# Patient Record
Sex: FEMALE
[De-identification: names, ages, dates, MRNs, and addresses within clinical notes are randomized per-mention and may not be internally consistent; named-entity substitution may affect disease eponyms.]

---

## 2019-02-13 ENCOUNTER — HOSPITAL ENCOUNTER (OUTPATIENT)
Dept: HOSPITAL 72 - SUR | Age: 34
Discharge: HOME | End: 2019-02-13
Payer: COMMERCIAL

## 2019-02-13 VITALS — DIASTOLIC BLOOD PRESSURE: 64 MMHG | SYSTOLIC BLOOD PRESSURE: 100 MMHG

## 2019-02-13 VITALS — SYSTOLIC BLOOD PRESSURE: 102 MMHG | DIASTOLIC BLOOD PRESSURE: 67 MMHG

## 2019-02-13 VITALS — SYSTOLIC BLOOD PRESSURE: 103 MMHG | DIASTOLIC BLOOD PRESSURE: 63 MMHG

## 2019-02-13 VITALS — BODY MASS INDEX: 18.4 KG/M2 | WEIGHT: 100 LBS | HEIGHT: 62 IN

## 2019-02-13 VITALS — SYSTOLIC BLOOD PRESSURE: 98 MMHG | DIASTOLIC BLOOD PRESSURE: 60 MMHG

## 2019-02-13 VITALS — DIASTOLIC BLOOD PRESSURE: 61 MMHG | SYSTOLIC BLOOD PRESSURE: 104 MMHG

## 2019-02-13 VITALS — SYSTOLIC BLOOD PRESSURE: 115 MMHG | DIASTOLIC BLOOD PRESSURE: 60 MMHG

## 2019-02-13 VITALS — SYSTOLIC BLOOD PRESSURE: 104 MMHG | DIASTOLIC BLOOD PRESSURE: 74 MMHG
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
## 2019-02-13 DIAGNOSIS — T84.84XA: Primary | ICD-10-CM

## 2019-02-13 DIAGNOSIS — J30.9: ICD-10-CM
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
## 2019-02-13 PROCEDURE — 94150 VITAL CAPACITY TEST: CPT

## 2019-02-13 PROCEDURE — 20680 REMOVAL OF IMPLANT DEEP: CPT

## 2019-02-13 PROCEDURE — 94003 VENT MGMT INPAT SUBQ DAY: CPT

## 2019-02-13 PROCEDURE — 81025 URINE PREGNANCY TEST: CPT

## 2019-02-13 NOTE — 48 HOUR POST ANESTHESIA EVAL
Post Anesthesia Evaluation


Procedure:  RIGHT foot hardware removal attempted


Date of Evaluation:  Feb 13, 2019


Time of Evaluation:  13:28


Blood Pressure Systolic:  98


0:  60


Pulse Rate:  47


Respiratory Rate:  20


Temperature (Fahrenheit):  97.8


O2 Sat by Pulse Oximetry:  98


Airway:  patent


Nausea:  No


Vomiting:  No


Pain Intensity:  0


Hydration Status:  adequate


Cardiopulmonary Status:


stable


Mental Status/LOC:  patient returned to baseline


Follow-up Care/Observations:


per podiatry


Post-Anesthesia Complications:


none


Follow-up care needed:  ready to discharge











Grace Starr CRNA Feb 13, 2019 13:30

## 2019-02-13 NOTE — HISTORY AND PHYSICAL REPORT
DATE OF ADMISSION:  02/13/2019

PODIATRIC HISTORY AND PHYSICAL



HISTORY OF PRESENT ILLNESS:  This is a 33-year-old female that is admitted

today for an outpatient procedure of her right foot.  The patient had been

complaining of retained screw from prior bunionectomy, which was performed

last year and she indicated that the screw was misplaced and is causing

pain and discomfort while wearing shoe-gear.  The patient underwent

minimal incision type of bunionectomy to her right foot in May 2018 and

the healing process was uneventful.  She is concerned about the protrusion

of the screw and would like to have the removal of the hardware.  The

patient was consulted on surgery and was admitted today for the removal of

the hardware.



PAST MEDICAL HISTORY:  Unremarkable.



MEDICATIONS:  None.



ALLERGIES:  No known drug allergies.



PODIATRIC PHYSICAL EXAMINATION:

VASCULAR STATUS:  The dorsalis pedis and posterior tibial arteries are

equally strong measuring 3/4 bilaterally.  The capillary filling time is

less than 3 seconds to all digits bilaterally.  Skin temperature is warm.

Homans sign is negative.

NEUROLOGICAL:  Intact reflexes, Achilles and patellar measuring 2/4

bilaterally.  Sensation, proprioception, and vibration are all intact in

bilateral lower extremities.  Babinski is negative.  Clonus is absent

bilaterally.

MUSCULOSKELETAL:  Proper alignment of the right hallux, status post

bunionectomy.  There is hallux abductovalgus with bunion deformity on the

left foot.  The lesser digits are normal without any deformities.  Range

of motions of all joints are full and without crepitation or pain

bilaterally.  Palpable protruding screw is noted over the medial aspect of

the right foot at the level of the first metatarsal shaft proximally.

DERMATOLOGICAL:  Small scar from minimal incision surgery on the right

foot.  No other lesions or ulcerations are noted bilaterally.  All nails

are present and healthy bilaterally.



RADIOGRAPHIC EXAMINATION:  Evidence of a chevron type osteotomy with

capital transposition of the head of the first metatarsal on the right

foot.  The bone had healed from the bunionectomy and an evidence of screw,

which is driven from proximal medial to distal lateral is noted from the

midshaft of the first metatarsal extending to the head of the first

metatarsal.  No other hardware is present.



ASSESSMENT:  Painful screw, status post bunionectomy, right foot.



PLAN:  The patient is admitted today for an outpatient surgery in order to

remove the painful screw from her right foot.  Risks, complications, and

alternatives discussed with the patient.  Postoperative medications were

dispensed to the patient.  The patient elected to proceed with surgery.









  ______________________________________________

  Hermann Power D.P.M.





DR:  GUY

D:  02/13/2019 15:27

T:  02/13/2019 19:22

JOB#:  [id number]

CC:

## 2019-02-13 NOTE — PRE-PROCEDURE NOTE/ATTESTATION
Pre-Procedure Note/Attestation


Complete Prior to Procedure


Planned Procedure:  right


Procedure Narrative:


Removal of screw right foot





Indications for Procedure


Pre-Operative Diagnosis:


Painful hardware right foot





Attestation


I attest that I discussed the nature of the procedure; its benefits; risks and 

complications; and alternatives (and the risks and benefits of such alternatives

), prior to the procedure, with the patient (or the patient's legal 

representative).





I attest that, if there was a reasonable possibility of needing a blood 

transfusion, the patient (or the patient's legal representative) was given the 

[facility] standardized written summary, pursuant 

to the [place] Blood Safety Act ([place] Health and Safety Code # 1645, as 

amended).





I attest that I re-evaluated the patient just prior to the surgery and that 

there has been no change in the patient's H&P, except as documented below:











Hermann Power DPM Feb 13, 2019 11:16

## 2019-02-13 NOTE — OPERATIVE NOTE - DICTATED
DATE OF OPERATION:  02/13/2019

SURGEON:  Hermann Power D.P.M.



ANESTHESIOLOGIST:  Grace Starr CRNA



ANESTHESIA:  Local standby.



PREOPERATIVE DIAGNOSIS:  Painful hardware, right foot.



POSTOPERATIVE DIAGNOSIS:  Painful hardware, right foot.



PROCEDURE PERFORMED:  Failed attempt to removal of screw, right

foot.



DESCRIPTION OF THE OPERATION:  The patient was brought to the operating

room and was placed on the operating room table in the supine position.

IV sedation was administered by the anesthesiologist.  Local anesthesia

consisting of 0.5% Marcaine plain, total of 10 mL was administered to the

right foot.  An ankle tourniquet was applied to the right lower extremity.

The foot was prepped and draped in the usual sterile manner.  An Esmarch

bandage was utilized to exsanguinate the blood and the right ankle

tourniquet was inflated to 250 mmHg.  Attention was directed to the right

foot where an approximately 1.5 cm linear incision was centered over a

protruding screw at the level of the medial aspect of the first metatarsal

midshaft.  The incision was deepened with care being taken to cauterize

and ligate all bleeders.  The soft tissue was reflected and the screw was

exposed.  At this point, multiple screwdrivers from a variety of hardware sets,

which included the small and mini AO sets were utilized in an attempt to

remove the screw and all attempts had failed.  Other instrumentations

including a straight Nicolasa and mosquitoes were also utilized in an attempt

to rotate the screw, which was buried deep into the first metatarsal all

the way to the head.  After the attempts were all failed, the wound was

flushed utilizing sterile saline and it was decided to close the wounds

when the surgery time was nearing almost 100 minutes.  At this point, the

skin was reapproximated utilizing 4-0 nylon in a simple interrupted type

stitch.  The wound was dressed utilizing an Adaptic 4 x 4 gauze and Coban.

The right ankle tourniquet was deflated and vascular supply was noted to

all digits of right foot.  The patient tolerated the procedure well and

left the operating room to recovery room with all vital signs stable.









  ______________________________________________

  Hermann Power D.P.M.





DR:  SINGH

D:  02/13/2019 16:40

T:  02/13/2019 21:50

JOB#:  [id number]

CC:



ANTOINE

## 2019-02-13 NOTE — OPERATIVE NOTE - DICTATED
DATE OF OPERATION:  02/13/2019

NOTE:  INCOMPLETE DICTATION



SURGEON:  Hermann Power DPM.



ANESTHESIOLOGIST:









  ______________________________________________

  Hermann Power D.P.M.





DR:  OBI

D:  02/13/2019 16:33

T:  02/13/2019 20:41

JOB#:  [id number]

CC:

## 2019-02-13 NOTE — ANETHESIA PREOPERATIVE EVAL
Anesthesia Pre-op PMH/ROS


General


Date of Evaluation:  Feb 13, 2019


Time of Evaluation:  10:28


Anesthesiologist:  Grace Starr CRNA


ASA Score:  ASA 1


Mallampati Score


Class I : Soft palate, uvula, fauces, pillars visible


Class II: Soft palate, uvula, fauces visible


Class III: Soft palate, base of uvula visible


Class IV: Only hard plate visible


Mallampati Classification:  Class I


Surgeon:  Jannet


Diagnosis:  RIGHT foot painful hardware


Surgical Procedure:  RIGHT foot hardware removal


Anesthesia History:  none


Family History:  no anesthesia problems


Medications:  see eMAR


Patient NPO?:  Yes


NPO Date:  Feb 13, 2019


NPO Time:  00:00





Past Medical History


Cardiovascular:  Denies: HTN, CAD, MI, valve dz, arrhythmia, other


Pulmonary:  Reports: other - allergic rhinitis; 


   Denies: asthma, COPD, FRANCESCO


Gastrointestinal/Genitourinary:  Denies: GERD, CRI, ESRD, other


Neurologic/Psychiatric:  Denies: dementia, CVA, depression/anxiety, TIA, other


Endocrine:  Denies: DM, hypothyroidism, steroids, other


HEENT:  Denies: cataract (L), cataract (R), glaucoma, [place] (L), [place] (R), other


Hematology/Immune:  Denies: anemia, DVT, bleeding disorder, other


Musculoskeletal/Integumentary:  Reports: other - RIGHT bunion s/p bunionectomy; 


   Denies: OA, RA, DJD, DDD, edema


PMH Narrative:


as above


PSxH Narrative:


bunionectomy, breast implant





Anesthesia Pre-op Phys. Exam


Physician Exam





Vital Sign - Last 24 Hours








 2/13/19 2/13/19





 10:22 10:34


 


Temp 97.1 


 


Pulse 52 


 


Resp 18 


 


B/P (MAP) 104/74 


 


Pulse Ox 100 


 


O2 Delivery Room Air Room Air








Constitutional:  NAD


Neurologic:  CN 2-12 intact


Cardiovascular:  RRR


Respiratory:  CTA





Airway Exam


Mallampati Score:  Class I


MO:  full


Neck:  FROM


TMD:  > 3 FB


ROM:  full


Teeth:  intact


Dentures:  no upper, no lower





Anesthesia Pre-op A/P


Labs


Serum Pregnancy Test


HCG (-)





Risk Assessment & Plan


Assessment:


ASA 1 ok to proceed


Plan:


MAC


Status Change Before Surgery:  No





Pre-Antibiotics


Given Within 1 Hr of Incision:  Grace Hogue CRNA Feb 13, 2019 10:25

## 2019-02-13 NOTE — IMMEDIATE POST-OP EVALUATION
Immediate Post-Op Evalulation


Immediate Post-Op Evalulation


Procedure:  RIGHT foot hardware removal attempted


Date of Evaluation:  Feb 13, 2019


Time of Evaluation:  13:20


IV Fluids:   ml


Blood Pressure Systolic:  100


Blood Pressure Diastolic:  64


Pulse Rate:  52


Respiratory Rate:  20


O2 Sat by Pulse Oximetry:  98


Temperature (Fahrenheit):  98.7


Pain Score (1-10):  0


Nausea:  No


Vomiting:  No


Complications


none


Patient Status:  reacts, patent


Hydration Status:  adequate


Drug:  cefazolin 1 gm


Given Within 1 Hr of Incision:  Yes


Time Given:  11:25











Grace Starr CRNA Feb 13, 2019 13:28

## 2019-02-13 NOTE — BRIEF OPERATIVE NOTE
Immediate Post Operative Note


Operative Note


Pre-op Diagnosis:


Painful hardware right foot


Procedure:


failed attempt to remove hardware


Post-op Diagnosis:  same as pre-op


Surgeon:  Jannet


Anesthesia:  MAC


Specimen:  none


Complications:  none


Condition:  stable


Fluids:  100


Estimated Blood Loss:  none


Drains:  none


Implant(s) used?:  No











Hermann Power DPM Feb 13, 2019 13:19